# Patient Record
Sex: MALE | Race: WHITE
[De-identification: names, ages, dates, MRNs, and addresses within clinical notes are randomized per-mention and may not be internally consistent; named-entity substitution may affect disease eponyms.]

---

## 2020-04-06 ENCOUNTER — APPOINTMENT (OUTPATIENT)
Dept: ORTHOPEDIC SURGERY | Facility: CLINIC | Age: 60
End: 2020-04-06
Payer: MEDICARE

## 2020-04-06 VITALS — BODY MASS INDEX: 32.08 KG/M2 | HEART RATE: 86 BPM | TEMPERATURE: 97.9 F | WEIGHT: 250 LBS | HEIGHT: 74 IN

## 2020-04-06 DIAGNOSIS — Z86.39 PERSONAL HISTORY OF OTHER ENDOCRINE, NUTRITIONAL AND METABOLIC DISEASE: ICD-10-CM

## 2020-04-06 DIAGNOSIS — M17.11 UNILATERAL PRIMARY OSTEOARTHRITIS, RIGHT KNEE: ICD-10-CM

## 2020-04-06 DIAGNOSIS — M25.561 PAIN IN RIGHT KNEE: ICD-10-CM

## 2020-04-06 DIAGNOSIS — Z86.79 PERSONAL HISTORY OF OTHER DISEASES OF THE CIRCULATORY SYSTEM: ICD-10-CM

## 2020-04-06 DIAGNOSIS — Z78.9 OTHER SPECIFIED HEALTH STATUS: ICD-10-CM

## 2020-04-06 PROCEDURE — 73564 X-RAY EXAM KNEE 4 OR MORE: CPT | Mod: RT

## 2020-04-06 PROCEDURE — 99204 OFFICE O/P NEW MOD 45 MIN: CPT

## 2020-04-06 RX ORDER — ATORVASTATIN CALCIUM 80 MG/1
TABLET, FILM COATED ORAL
Refills: 0 | Status: ACTIVE | COMMUNITY

## 2020-04-06 RX ORDER — LISINOPRIL 30 MG/1
TABLET ORAL
Refills: 0 | Status: ACTIVE | COMMUNITY

## 2020-04-06 NOTE — DISCUSSION/SUMMARY
[de-identified] : Patient was informed of his findings and shown his x-rays. He was advised he has mild arthritis of his right knee. Patient will continue taking NSAIDs as needed and use ice and rest \par if further symptoms persist he may come in for cortisone injection.
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
## 2020-04-06 NOTE — PHYSICAL EXAM
[de-identified] : Physical examination: Right knee discloses mild medial and lateral joint line tenderness. No acute effusions. No signs of instability of the right knee or neurovascular deficits the right lower extremity. nega Nve Gabrielle's, negative Apley's OOptional range of motion between 7-125° flexion [de-identified] : X-rays taken of the right knee and AP lateral skyline and open notch views disclose  minimal medial and lateral joint space narrowing.

## 2020-04-20 ENCOUNTER — TRANSCRIPTION ENCOUNTER (OUTPATIENT)
Age: 60
End: 2020-04-20